# Patient Record
Sex: FEMALE | Race: WHITE | Employment: OTHER | URBAN - METROPOLITAN AREA
[De-identification: names, ages, dates, MRNs, and addresses within clinical notes are randomized per-mention and may not be internally consistent; named-entity substitution may affect disease eponyms.]

---

## 2023-09-08 PROBLEM — K05.10 GINGIVITIS: Status: ACTIVE | Noted: 2023-09-08

## 2023-09-08 PROBLEM — F73 PROFOUND INTELLECTUAL DISABILITY: Status: ACTIVE | Noted: 2023-09-08

## 2023-09-08 PROBLEM — F32.A DEPRESSION: Status: ACTIVE | Noted: 2023-09-08

## 2023-09-08 PROBLEM — E78.5 HYPERLIPIDEMIA: Status: ACTIVE | Noted: 2023-09-08

## 2023-09-08 PROBLEM — G31.9 CEREBELLAR ATROPHY (CMS-HCC): Status: ACTIVE | Noted: 2023-09-08

## 2023-09-08 PROBLEM — F98.4 STEREOTYPIC MOVEMENT DISORDER: Status: ACTIVE | Noted: 2023-09-08

## 2023-09-08 PROBLEM — H18.609 KERATOCONUS: Status: ACTIVE | Noted: 2023-09-08

## 2023-09-08 PROBLEM — K59.00 CONSTIPATION: Status: ACTIVE | Noted: 2023-09-08

## 2023-09-08 PROBLEM — J30.9 ALLERGIC RHINITIS: Status: ACTIVE | Noted: 2023-09-08

## 2023-09-08 PROBLEM — F32.81 LATE LUTEAL PHASE DYSPHORIC DISORDER (LLPDD): Status: ACTIVE | Noted: 2023-09-08

## 2023-09-08 PROBLEM — Q87.83: Status: ACTIVE | Noted: 2023-09-08

## 2023-09-08 PROBLEM — M41.9 SCOLIOSIS: Status: ACTIVE | Noted: 2023-09-08

## 2023-09-08 RX ORDER — DEXTROMETHORPHAN HYDROBROMIDE, GUAIFENESIN 5; 100 MG/5ML; MG/5ML
LIQUID ORAL 4 TIMES DAILY
COMMUNITY

## 2023-09-08 RX ORDER — FERROUS SULFATE, DRIED 160(50) MG
TABLET, EXTENDED RELEASE ORAL
COMMUNITY

## 2023-09-08 RX ORDER — SIMVASTATIN 20 MG/1
1 TABLET, FILM COATED ORAL DAILY
COMMUNITY

## 2023-09-08 RX ORDER — CHOLECALCIFEROL (VITAMIN D3) 25 MCG
2 TABLET ORAL DAILY
COMMUNITY

## 2023-09-08 RX ORDER — BISACODYL 10 MG/1
SUPPOSITORY RECTAL
COMMUNITY

## 2023-09-08 RX ORDER — FLUOXETINE HYDROCHLORIDE 40 MG/1
CAPSULE ORAL
COMMUNITY

## 2023-09-08 RX ORDER — CLONIDINE HYDROCHLORIDE 0.1 MG/1
10 TABLET, EXTENDED RELEASE ORAL DAILY PRN
COMMUNITY

## 2023-09-08 RX ORDER — DIAZEPAM 5 MG/1
5 TABLET ORAL NIGHTLY PRN
COMMUNITY

## 2023-09-08 RX ORDER — IPRATROPIUM BROMIDE 42 UG/1
SPRAY, METERED NASAL
COMMUNITY
Start: 2021-10-25 | End: 2023-11-17 | Stop reason: ALTCHOICE

## 2023-10-19 RX ORDER — DEXTROSE 40 %
15 GEL (GRAM) ORAL ONCE AS NEEDED
COMMUNITY

## 2023-10-19 RX ORDER — MULTIVIT-MIN/IRON FUM/FOLIC AC 7.5 MG-4
1 TABLET ORAL DAILY
COMMUNITY

## 2023-10-19 RX ORDER — PHENYLEPHRINE HCL 10 MG/1
10 TABLET, FILM COATED ORAL EVERY 4 HOURS PRN
COMMUNITY

## 2023-10-20 ENCOUNTER — APPOINTMENT (OUTPATIENT)
Dept: CARDIOLOGY | Facility: CLINIC | Age: 58
End: 2023-10-20
Payer: MEDICARE

## 2023-11-17 ENCOUNTER — OFFICE VISIT (OUTPATIENT)
Dept: CARDIOLOGY | Facility: CLINIC | Age: 58
End: 2023-11-17
Payer: MEDICARE

## 2023-11-17 VITALS
DIASTOLIC BLOOD PRESSURE: 70 MMHG | SYSTOLIC BLOOD PRESSURE: 118 MMHG | WEIGHT: 100.4 LBS | HEART RATE: 68 BPM | HEIGHT: 55 IN | TEMPERATURE: 97.8 F | BODY MASS INDEX: 23.23 KG/M2

## 2023-11-17 DIAGNOSIS — E78.2 MIXED HYPERLIPIDEMIA: Primary | ICD-10-CM

## 2023-11-17 PROCEDURE — 1036F TOBACCO NON-USER: CPT | Performed by: INTERNAL MEDICINE

## 2023-11-17 PROCEDURE — 99213 OFFICE O/P EST LOW 20 MIN: CPT | Performed by: INTERNAL MEDICINE

## 2023-11-17 ASSESSMENT — PAIN SCALES - GENERAL: PAINLEVEL: 0-NO PAIN

## 2023-11-17 NOTE — PROGRESS NOTES
1. Severe intellectual disability  2. Hyperlipidemia  3. Left eye blindness  4. Congenital quadriparesis  5. Diverticulosis    Patient is a 58-year-old female with the pertinent past medical history as noted above who presents today for follow-up.  Patient with intellectual disability unable to provide history patient is accompanied by the primary care provider who provides a history no active medical problems no behavioral changes    Vital signs reviewed and stable BMI not obtained weight 100.4 pounds, heart rate of 68 bpm and blood pressure 118/70 mmHg  No carotid bruits upstroke and volumes are normal, heart rate and rhythm are regular S1-S2 are normal no gallop or murmurs, lungs no spontaneous inspiration is clear, abdomen is mildly distended positive bowel sounds and soft and the lower extremities have no edema    November 15, 2023  Twelve-lead EKG poor quality normal sinus rhythm normal axis normal record  Hemoglobin 13.9 hematocrit of 41.3  Sodium 138, potassium 3.8, BUN 22, creatinine of 0.7 with normal liver function test  Total cholesterol 195, triglycerides 68, HDL 75, and LDL of 106    Hyperlipidemia with recent cholesterol profile acceptable very good no changes indicated  Yearly blood work including CBC CMP and lipid panel as well as yearly EKG to be obtained  Return to my clinic in 1 year.

## 2023-12-05 ENCOUNTER — APPOINTMENT (OUTPATIENT)
Dept: BEHAVIORAL HEALTH | Facility: CLINIC | Age: 58
End: 2023-12-05
Payer: MEDICARE

## 2024-02-14 ENCOUNTER — TELEMEDICINE (OUTPATIENT)
Dept: BEHAVIORAL HEALTH | Facility: CLINIC | Age: 59
End: 2024-02-14
Payer: MEDICARE

## 2024-02-14 DIAGNOSIS — F32.4 MAJOR DEPRESSIVE DISORDER IN PARTIAL REMISSION, UNSPECIFIED WHETHER RECURRENT (CMS-HCC): ICD-10-CM

## 2024-02-14 DIAGNOSIS — F98.4 STEREOTYPIC MOVEMENT DISORDER: ICD-10-CM

## 2024-02-14 PROCEDURE — 99215 OFFICE O/P EST HI 40 MIN: CPT | Performed by: PSYCHIATRY & NEUROLOGY

## 2024-02-14 PROCEDURE — 1036F TOBACCO NON-USER: CPT | Performed by: PSYCHIATRY & NEUROLOGY

## 2024-02-14 NOTE — PROGRESS NOTES
"    Both patient and staff caregivers were provided information re medications, including benefits, potential risks, and expected and unexpected side effects.      Provider Impressions       DIAGNOSES            Depression, NEC                  Stereotypic Movement disorder            LLPDD    ID, Profound     Both the patient, and family and caregivers and guardians as appropriate, were informed of the current need to conduct treatment via telephone. I have confirmed the patients identity in the following (minimum of 3) acceptable identifiers as per  Policy PH-9 (name, , ssn)             PRESENT FOR APPOINTMENT                    Patient     Residential caregiver Desiree Cruz RN, knows her over last 8 years.     Chief Complaint    An interactive audio and video telecommunication system which permits real time communications between the patient (at the originating site) and provider (at the distant site) was utilized to provide this telehealth service.      \"She is very stable\"            SUBJECTIVE      Last psychiatry visit at  in 2023    No change in her fluoxetine dose or schedule at that visit.. Continued at 40 mg per day.     (The most recent change in fluoxetine dose was an increase from 30 to 40 mg per day, in 2014.     Geodon 20 mg had been stopped in 2012, she has not taken a routine dose of Geodon since then.     Medroxyprogesterone IM was stopped in 2015).     Atrovent nasal spray was tapered and stopped in last year.  Now available prn, but has not seemed to need it since discontinuation.      Beneprotein supplement also stopped in last year, since 2023.   This stopped surrounded some weight gain over last year.      No other changes in medications since last visit (confirmed with caregiver)    Current medications include:        MVI one per day  Fluoxetine 40 mg per day in morning  Oyster shell calcium with Vitamin D 500 mg-200 mg two per " "day  Simvastatin 20 mg per day  Vitamin D 2000 units per day    Prune juice daily to prevent constipation    Acetaminophen as needed for headache or other pain (rarely used in interim)      Ms. Teixeira seems very stable over the last year.     One residential peer moved in since last visit.  He is a male, and Ms. Teixeira seems somewhat interested in his presence.  She will note his location in her environment, and is interested when he enters or leaves the area.        She continues to live in 8 person home.  Except as above, she does not seem to pay much attention to peers for the most part, preferring to spend time with staff.     No significant recent mood episodes or symptoms. Has had rare if any mood episodes over the last year.    Almost no \"bad days\" over last year.  In the past, she would occasionally become tearful and irritable for a day or more, and try to pinch staff who try to comfort her. When very agitated, she would throw some toys or other objects.    After some tears, would take a nap and then wake up back to baseline. A very challenging episode could last 2-3 hours.    This behavior has been further attenuated since her last visit one year ago.   (In the past, she would have major behavioral episodes, during which she would escalate behavior over several minutes. Episodes would involve pulling her hair, biting herself, wringing her hands, crying, trying to pinch caregivers, screaming, scooting across the floor on her bottom, throwing objects, moving furniture and people (peers and staff). Could last for several hours in the past, and would sometimes result in ED visit. None of recent episodes have reached this level of upset.     May have occasional half day of resistance to direction, and avoidance of routine, but these are not characterized by any tears or behavioral challenges. Does not appear any more compulsive or less social lately. Has some touching rituals with doorknobs and light switches, " not interruptive of her other activities.     Her current weight is 104 pounds, a 8 pound weight gain since last visit.   BMI now at upper end of normal range.  Beneprotein supplement stopped, not seen as necessary lately.       She is still not attending outside day programming, now over > 3 years since before previous COVID-related restrictions. She is however, going on community outings with staff caregivers on ad hoc basis.     Has visits from family members every few months, for a visit and ride in the car. Does well with those visits. Some favorite caregivers working elsewhere have also been able to visit.     Her sleep is unchanged in the last year. Still sleeping from 9-10 pm until 6 am, when awakened by staff. May sleep until 7 or later on weekends. May sleep additional 2 hours in daytime over several short naps. Does not appear overmedicated.     She wears Attends full time, but is on a toileting schedule in the daytime every two hours. She will urinate on the commode most visits. She is checked but not awakened to toilet in the night, and changed if wet in the night. Some wet clothing in the daytime requiring a change of clothes, perhaps one time per day.     No recent undressing in public settings. Sometimes decides she wants to change clothes during a toileting visit and will undress in bathroom during the day.     In the past, when a residential peer with epilepsy had a major motor seizure in her presence, she would engage in limited SIB (biting herself); it would continue only until the seizure episode was over. Not seen in recent months.     In the past, she engaged in significant ritualistic behavior; in which she would repeatedly close doors (front door and dorantes closet door); many times per day, in ritualistic manner. Also ritualistic flipping of light switches, and straightening of her favorite toys. Overall less attention and time spent in these activities lately.     No other straightening or  other rituals.     No recent pica. Still may mouth non-food objects, but does not swallow. No GI problems; no recent vomiting    Has both wheelchair and walker at home. Mostly uses the walker, or holds on to staff or furniture, or scoots on her bottom across the room. Uses WC only for trips out of home.     No recent mood elevation. Very rare giggling episodes, and mood is not excessively happy. She will appear happy with favorite staff attention, or family visits, but this is appropriate to situation, and not excessive or disruptive.         REVIEW OF SYMPTOMS     Atrovent nasal spray tapered and stopped in last year.  Seems to have less nasal drainage since then.      One interim fall, apparently fell forward when walking with walker.   Walker but not observed by staff.   Two upper incisors were dislodged and are gone.  No real change in diet as a result.  Healed.  No other falls.     No apparent change in her appetite or eating after losing these teeth.   Her diet (mechanical soft, sometimes nearly pureed) has remained the same.   Has actually gained weight over the last year.   Beneprotein stopped in November 2023.        Had COVID infection in March 2022. Not very symptomatic. URI symptoms, fatigued, loss of appetite, over a few days. No apparent long term symptoms.     No interim trips to ED or urgent care.     Staff have noted a relatively new movement of her head, tipping her head backward and extending her neck.    Does several times over several minutes, then returns to baseline.  Unclear etiology.   First seen 2-3 months ago, intermittent, has not worsened since first seen.    She does have remote history of exposure to dopamine blockers, none in > 10 years.       One interim visit with cardiologist Dr. Cervantes at  in November 2023.    Had updated EKG around that visit.   Tracing not available in her emr today, will be scanned or faxed to be included in emr.   EKG in  November 2021:  NSR 77 bpm, QTc 411  msec. No change in medications.     (She had had a previous trial of diazoxide to increase sugar, over one month in April-May 2018. Seemed to increase sugars overnight. However, developed rash over entire body along with fever, thought to be drug rash. Diazoxide stopped, seen in ED at [facility], treated with benadryl and doxycycline (for UTI). Then also treated with steroids over 10 days. Rash cleared over one week. Has not restarted diazoxide, now listed as allergy)    No interim visits with endocrinologist Dr Thompson in [place], who has checked thyroid status in the past.   No interim visits, last visit perhaps in Sept 2021. At that visit, reviewed fingerstick BS done daily; all were within normal range, so daily BS were discontinued. She has had a formal diagnosis of hypoglycemia. BS checks now available prn for symptoms of low BS, none done since last visit    She has gained weight over the last year. Seems associated with staff taking over her feedings.   Beneprotein stopped in November 2023.      Uses wheeled walker for most ambulation, has wide-based rolling gait, does not usually try to walk without walker. If not handy, she lowers self to ground and scoots on her bottom.     Mammogram in August 2017, had abn in right breast (asymmetry), US in October 2018 demonstrated two apparent cystic areas, f/u breast US in spring and fall of 2019 were unchanged. Mammogram and R Breast US in June 21 unchanged. Has annual mammogram at [facility] in fall of 2022.   Had mammogram in Jan 2024, no findings.       No other medical issues in interim.     Now off depot medroxyprogesterone for > 7 years, with no return of menses or menstrual spotting.     No interim change in bowel function. Still having BM daily or every other day.   Sometimes begins BM in Attends and then finishes on commode. Given prn suppository for no BM after 3 days.   May average a suppository once per month over last year.     No recent loose or liquid stools    She  continues to eat a ground diet, with regular (non-thickened) liquids; and an additional snack at bedtime. Staff have continued feeding her over the last one year. She had been marginally underweight, and was struggling to get enough food in on her own feeding. With staff now feeding her, she has gained weight, to > 100 pounds, and Beneprotein supplement has been stopped.   BMI now at upper end of normal range.     No signs of any GI distress, no recent vomiting.         Continues to mouth many objects, but no apparent pica. Keeping mouth closed otherwise in interim. Continues to breath better through her nose.     Most recent eye exam in 2021, with a home visit based exam.  Her home is transitioning to a new ophthalmologist.   Will sometimes push on non-functional eye with knuckle, perhaps indicating head pain?   Does not seem to push on her functional eye at all.       PAIN      She is unable to verbally report pain or discomfort. Her staff and family think she may cry, or cry out, if in pain.     She will sometimes rub her head and become tearful, and if staff are not able to comfort her, she can be given prn acetaminophen, with some apparent benefit within 20 minutes or so. Used infrequently since last visit. Maximum use about one time per month.              MEDICAL PROBLEMS     No major medical issues since last visit.    Had second COVID booster in August 2022. Had flu shot as well.     PCP is Dr. Goyal.    Had PE in August 2023.    No change in medication.     She has a number medical diagnoses, including:    Amina-Moon-Beidl syndrome  Keratoconus left eye, with corneal scar  LLPDD by history (no longer has menses or cycling)  Depression NOS  Stereotypic movement disorder with SIB  Chronic sinusitis and rhinitis  Scoliosis  Periodontal disease  Gingivitis  Cerebellar atrophy  Hyperlipidemia  Hypoglycemia  Risk for bone demineralization  Constipation.     There is a newer visiting neurologist at her  residence (from [facility] Dr. Vargas), but she has not been following Ms. Teixeira. There is no history of epilepsy.     Followed by  cardiologist Dr. Reynoso, in [place]. Had EKG fall of 2022     GYN appts with Dr. Novak, in [place], most recent visit Nov 2022, with pre-medication, she permitted some pelvic examination.     ALLERGY--    HALDOL  ZYPREXA  RISPERIDONE  IMIPRAMINE  BACTRIM  DIAZOXIDE    SIDE EFFECTS           None attributed to current meds    CHANGE IN TREATMENT          No change in medication last visit.              COMPLIANCE           Compliance good        MENTAL STATUS EVALUATION         Able to observe on video visit.             BEHAVIOR            Sitting at corner of couch in day room.   Awake and alert.  Perhaps some eye contact with zoom, and was trying to access camera. Could not directly follow directions today on video.    Not at all agitated. Her arm movements are coarse, but functional. No other abnormal movements seen.   Did not demonstrate the head tipping described above during this visit            MEMORY            Not testable     SENSORY            blind or nearly blind in one eye    COMMUNICATION           Mostly mute; some non-speech vocalization     AFFECT            Flattened     MOOD            Not able to assess     THOUGHT PROCESS          Difficult to assess       THOUGHT CONTENT          Same       PSYCHOTIC SYMPTOMS          None seen, but very difficult to assess     ORIENTATION           To person and place; responds to name     CONCENTRATION           Short span. Seems consistent with IDD level        CALCULATION           none     FUND OF KNOWLEDGE          Severe IDD       JUDGMENT            not able to assess fully, but appears poor overall       GAIT             Not fully observed today. Gait is wide based, with some fall risk. No other abn movements of limbs today.     EPS             None seen; NA       AIMS             Movements are jerky, and coarse, but no  facial grimacing or chewing or tongue out of mouth.  Did not see head/neck movement described.     VS    Done twice per week at her residence.       LABS REVIEWED      November 2023:    CBC and CMP grossly normal.   HDL 75.  Vit D 67.  TSH not done    August 2022:    CBC grossly normal by report.    July 2021:    CBC, CMP, lipds grossly normal. Vit D 63 (normal range)    January 2020:    CBC, BMP, and lipid panel grossly normal    January 2019:    CMP normal except glucose 56. Lipids normal. Vit D 64    April 2018:    CBC normal. CMP normal except glucose 118. Lactate normal. Strep screen negative, and blood cultures negative.     February 2018:    HgbA1C 5.3%. TSH, T4, ACTH, and am cortisol all normal.     November 2017:    Glucose 66, rest of CMP normal. Lipid panel grossly normal.     November 2016:    Vitamin D level normal range 56    October 2016:    Liver panel and lipid panel normal    July 2016:    CBC, CMP, lipid panel normal. Vitamin D 39     July 2015:    Hepatic panel normal. Lipids normal.     July 2014:    Chemistries normal, Vitamin D normal.     Fluoxetine level 748 ng/ml (range 100-600); and norfluoxetine level 238 (range 100-600) (at 40 mg per day)     August 2013:    Lipid and liver panel all normal    In May 2013, CBC normal, and glucose and HgbA1C normal.          June 2012:     Fluoxetine level 455, norfluoxetine level 127     EKG             None in interim     November 2021     Read by Cardiologist Dr. Cervantes as normal    Oct 2020:    NSR, 81 bpm,  msec    October 2019:    NSR, rate 73 bpm, QTc 422 msec.    November 2018:    Sinus rhythm, rate 86 bpm; QTc 405 msec.     August 2018, had rhythm strip in Cardiologist Dr. Reynoso office, where he calculated QTc of 423 msec.     August 2016:    Sinus rhythm, 92 bpm, possible old infarct, some non-specific ST-T changes, QTc 402 msec.     July 2014, SR, short TX, rate 99 bpm, QTc 444 or 420 msec.     April 2012: NSR, rate 84 bpm, and QTc  now lower (416 msec)      EKG in August 2011, with sinus tachycardia, ST elevation. QTc 447 msec (borderline)    Had echocardiogram in March 2011, grossly normal.     ASSESSMENT     Remains stable or improved since last visit.   Fewer behavioral challenges over last year.    No pervasive mood changes or state.   Has occasional less cooperative days, but not apparently part of a mood episode. No major outbursts since last visit.     Newer head movement described, not present today.      Sleep appears functional. Not sedated in daytime.     She has gained weight in interim, and BMI now high normal range.  PCP and Dietician are monitoring.    No obvious medication side effects. No GI symptoms.   Unclear if head movement may be dyskinesia surrounding previous use of antipsychotic medication.     Recent (Nov 2023) labs are normal.  No recent EKG.      No evidence of fluoxetine toxicity.     Today, recommend no change in her current fluoxetine dose.  No justification for addition of other psychotropic agent.    Discussed planned penitentiary of current psychiatrist.  Based on her stability, believe her PCP can continue to prescribe her stable dose of fluoxetine.   Could return to the  [facility] in future if situation worsens or further consultation is needed    PLAN             problems treated     f/u requested to prevent relapse     Suggest:    1. Continue fluoxetine at 40 mg per day in am; refill are done by PCP Dr. Goyal   2. Cardiac status is followed by Dr. Cervantes annually.   3. Please forward most recent EKG to our office for inclusion in her  chart  4.  Please try to videotape any movement of her head or neck and send to clinician for review      TREATMENT TYPE           [id number], Video based contact, addressing counseling and coordination of care 50 minutes, with > 50% c/c with caregivers; addressing s/s of illness; risks/benefits/side effects of medications, and behavioral approaches to illness        F/UP INTERNAL           None unless her situation worsens.